# Patient Record
Sex: FEMALE | Race: WHITE | NOT HISPANIC OR LATINO | Employment: UNEMPLOYED | URBAN - METROPOLITAN AREA
[De-identification: names, ages, dates, MRNs, and addresses within clinical notes are randomized per-mention and may not be internally consistent; named-entity substitution may affect disease eponyms.]

---

## 2019-04-10 ENCOUNTER — OFFICE VISIT (OUTPATIENT)
Dept: OBSTETRICS AND GYNECOLOGY | Facility: CLINIC | Age: 34
End: 2019-04-10
Payer: MEDICAID

## 2019-04-10 VITALS
DIASTOLIC BLOOD PRESSURE: 101 MMHG | SYSTOLIC BLOOD PRESSURE: 149 MMHG | WEIGHT: 128 LBS | BODY MASS INDEX: 20.57 KG/M2 | HEIGHT: 66 IN | HEART RATE: 75 BPM

## 2019-04-10 DIAGNOSIS — B96.89 BACTERIAL VAGINOSIS: Primary | ICD-10-CM

## 2019-04-10 DIAGNOSIS — N76.0 BACTERIAL VAGINOSIS: Primary | ICD-10-CM

## 2019-04-10 PROCEDURE — 99212 OFFICE O/P EST SF 10 MIN: CPT | Mod: S$GLB,,, | Performed by: OBSTETRICS & GYNECOLOGY

## 2019-04-10 PROCEDURE — 99212 PR OFFICE/OUTPT VISIT, EST, LEVL II, 10-19 MIN: ICD-10-PCS | Mod: S$GLB,,, | Performed by: OBSTETRICS & GYNECOLOGY

## 2019-04-10 RX ORDER — PROPRANOLOL HYDROCHLORIDE 60 MG/1
CAPSULE, EXTENDED RELEASE ORAL
COMMUNITY
End: 2022-10-18

## 2019-04-10 NOTE — PROGRESS NOTES
Subjective:       Patient ID: Stephy Spicer is a 33 y.o. female.    Chief Complaint: Vaginal Discharge    Patient is known to me with a history of recurrent bacterial vaginosis.  It has recurred now stasis seems to be worse as she gets older.  She has tried metronidazole with good results and recurs.  She denies douching.  Slide this time shows multiple clue cells or Trichomonas in the pH is basic    Review of Systems    Objective:      Physical Exam   Genitourinary: Vagina normal.   Genitourinary Comments: Patient is a pretty copious discharge with a mild musty odor pH is definitely basic       Assessment:     recurring bacterial vaginosis      Plan:       *  plan is metronidazole full 500 mg b.i.d. for 6 days the in Metrogel vaginal every 4 days and with intercourse

## 2019-05-09 ENCOUNTER — OFFICE VISIT (OUTPATIENT)
Dept: OBSTETRICS AND GYNECOLOGY | Facility: CLINIC | Age: 34
End: 2019-05-09
Payer: MEDICAID

## 2019-05-09 VITALS
WEIGHT: 133.38 LBS | HEART RATE: 70 BPM | DIASTOLIC BLOOD PRESSURE: 98 MMHG | SYSTOLIC BLOOD PRESSURE: 132 MMHG | BODY MASS INDEX: 21.53 KG/M2

## 2019-05-09 DIAGNOSIS — R10.32 LEFT LOWER QUADRANT PAIN: Primary | ICD-10-CM

## 2019-05-09 PROCEDURE — 99213 OFFICE O/P EST LOW 20 MIN: CPT | Mod: S$GLB,,, | Performed by: OBSTETRICS & GYNECOLOGY

## 2019-05-09 PROCEDURE — 99213 PR OFFICE/OUTPT VISIT, EST, LEVL III, 20-29 MIN: ICD-10-PCS | Mod: S$GLB,,, | Performed by: OBSTETRICS & GYNECOLOGY

## 2019-05-09 NOTE — PROGRESS NOTES
Subjective:       Patient ID: Stephy Spicer is a 33 y.o. female.    Chief Complaint: No chief complaint on file.    Patient is here for complaining of acute pain.  The pain started in her left lower quadrant around to her back about 2:00 a.m. she is scribe the pain is acute onset severe him sharp and stabbing.  She has had a hysterectomy right S&O    Review of Systems   Constitutional: Negative for activity change, appetite change, chills, fever and unexpected weight change.   HENT: Negative for congestion, dental problem, facial swelling, hearing loss and mouth sores.    Respiratory: Negative for apnea, chest tightness, shortness of breath and wheezing.    Cardiovascular: Negative for chest pain and leg swelling.   Gastrointestinal: Negative for abdominal distention, abdominal pain, anal bleeding, blood in stool, constipation, diarrhea, nausea, rectal pain and vomiting.   Genitourinary: Negative for decreased urine volume, difficulty urinating, dyspareunia, dysuria, enuresis, flank pain, frequency, genital sores, hematuria, menstrual problem, pelvic pain, urgency, vaginal bleeding, vaginal discharge and vaginal pain.   Musculoskeletal: Negative for arthralgias, back pain, joint swelling, myalgias and neck pain.   Skin: Negative for color change, pallor, rash and wound.   Allergic/Immunologic: Negative for immunocompromised state.   Neurological: Negative for dizziness, light-headedness and headaches.   Hematological: Negative for adenopathy. Does not bruise/bleed easily.   Psychiatric/Behavioral: Negative for agitation, behavioral problems, confusion, sleep disturbance and suicidal ideas. The patient is not nervous/anxious and is not hyperactive.        Objective:      Physical Exam   Constitutional: She is oriented to person, place, and time. She appears well-developed and well-nourished.   HENT:   Head: Normocephalic.   Nose: Nose normal.   Eyes: Pupils are equal, round, and reactive to light. Conjunctivae and  EOM are normal.   Neck: Normal range of motion. Neck supple.   Cardiovascular: Normal rate, regular rhythm, normal heart sounds and intact distal pulses.   Pulmonary/Chest: Effort normal and breath sounds normal.   Abdominal: Soft. Bowel sounds are normal. There is tenderness. There is guarding.   Genitourinary: Vagina normal and uterus normal.   Musculoskeletal: Normal range of motion.   Neurological: She is alert and oriented to person, place, and time.   Skin: Skin is warm and dry.   Psychiatric: She has a normal mood and affect. Her behavior is normal. Thought content normal.       Assessment:     abdominal pain acute possible kidney stone  Plan:       Referred to the emergency room so she can get the proper testing

## 2019-05-14 ENCOUNTER — OFFICE VISIT (OUTPATIENT)
Dept: OBSTETRICS AND GYNECOLOGY | Facility: CLINIC | Age: 34
End: 2019-05-14
Payer: MEDICAID

## 2019-05-14 VITALS
HEART RATE: 77 BPM | BODY MASS INDEX: 21.31 KG/M2 | WEIGHT: 132 LBS | DIASTOLIC BLOOD PRESSURE: 114 MMHG | SYSTOLIC BLOOD PRESSURE: 162 MMHG

## 2019-05-14 DIAGNOSIS — R10.2 PELVIC PAIN: Primary | ICD-10-CM

## 2019-05-14 PROCEDURE — 99213 PR OFFICE/OUTPT VISIT, EST, LEVL III, 20-29 MIN: ICD-10-PCS | Mod: S$GLB,,, | Performed by: OBSTETRICS & GYNECOLOGY

## 2019-05-14 PROCEDURE — 99213 OFFICE O/P EST LOW 20 MIN: CPT | Mod: S$GLB,,, | Performed by: OBSTETRICS & GYNECOLOGY

## 2019-05-14 NOTE — PROGRESS NOTES
Patient returns today still with left lower quadrant cane pain she went to the emergency room CT scan showed possible ruptured left ovarian cyst she is feeling better but still has a lot of pain and has chronic pain and left lower quadrant she had a previous right S&O hysterectomy she was told before the surgeon's possibly an ovarian cancer however was not advised to remove the other ovary pelvic examination is normal tenderness in the left lower quadrant patient desires removal the left ovary she id will schedule a laparoscopic left S&O

## 2019-06-04 ENCOUNTER — OFFICE VISIT (OUTPATIENT)
Dept: OBSTETRICS AND GYNECOLOGY | Facility: CLINIC | Age: 34
End: 2019-06-04
Payer: MEDICAID

## 2019-06-04 VITALS
BODY MASS INDEX: 20.6 KG/M2 | SYSTOLIC BLOOD PRESSURE: 130 MMHG | HEART RATE: 66 BPM | WEIGHT: 128.19 LBS | HEIGHT: 66 IN | DIASTOLIC BLOOD PRESSURE: 88 MMHG

## 2019-06-04 DIAGNOSIS — R10.2 PELVIC PAIN: Primary | ICD-10-CM

## 2019-06-04 PROCEDURE — 99213 PR OFFICE/OUTPT VISIT, EST, LEVL III, 20-29 MIN: ICD-10-PCS | Mod: S$GLB,,, | Performed by: OBSTETRICS & GYNECOLOGY

## 2019-06-04 PROCEDURE — 99213 OFFICE O/P EST LOW 20 MIN: CPT | Mod: S$GLB,,, | Performed by: OBSTETRICS & GYNECOLOGY

## 2019-06-04 RX ORDER — TRAMADOL HYDROCHLORIDE 50 MG/1
50 TABLET ORAL
COMMUNITY
Start: 2019-05-09 | End: 2022-10-18

## 2019-06-04 NOTE — PATIENT INSTRUCTIONS
Discussed surgery left S&O and also depending what we find on the left side will depend on the direction of surgery

## 2019-06-04 NOTE — PROGRESS NOTES
Patient is here for preop she has chronic pain in left lower quadrant with try her on birth control pills hormones incised in a which is help she is currently on some tramadol for the pain states she wants to with no removed understands she will be on hormones afterwards she was recently started on blood pressure pills Inderal 60 mg once a day history and physical dictated consents were explained written signed and witnessed and surgery scheduled for tomorrow

## 2019-06-05 ENCOUNTER — OUTSIDE PLACE OF SERVICE (OUTPATIENT)
Dept: OBSTETRICS AND GYNECOLOGY | Facility: CLINIC | Age: 34
End: 2019-06-05
Payer: MEDICAID

## 2019-06-05 PROCEDURE — 58661 PR LAP,RMV  ADNEXAL STRUCTURE: ICD-10-PCS | Mod: LT,,, | Performed by: OBSTETRICS & GYNECOLOGY

## 2019-06-05 PROCEDURE — 58661 LAPAROSCOPY REMOVE ADNEXA: CPT | Mod: LT,,, | Performed by: OBSTETRICS & GYNECOLOGY

## 2019-06-13 ENCOUNTER — OFFICE VISIT (OUTPATIENT)
Dept: OBSTETRICS AND GYNECOLOGY | Facility: CLINIC | Age: 34
End: 2019-06-13
Payer: MEDICAID

## 2019-06-13 VITALS
HEART RATE: 77 BPM | DIASTOLIC BLOOD PRESSURE: 96 MMHG | SYSTOLIC BLOOD PRESSURE: 141 MMHG | WEIGHT: 128.38 LBS | BODY MASS INDEX: 20.72 KG/M2

## 2019-06-13 DIAGNOSIS — G89.18 POST-OPERATIVE PAIN: Primary | ICD-10-CM

## 2019-06-13 PROCEDURE — 99024 PR POST-OP FOLLOW-UP VISIT: ICD-10-PCS | Mod: S$GLB,,, | Performed by: OBSTETRICS & GYNECOLOGY

## 2019-06-13 PROCEDURE — 99024 POSTOP FOLLOW-UP VISIT: CPT | Mod: S$GLB,,, | Performed by: OBSTETRICS & GYNECOLOGY

## 2019-06-13 NOTE — PROGRESS NOTES
Patient is is here status post laparoscopic left S&O for hemorrhagic draining ovarian cyst she is doing well except for significant constipation or put her on Linzess her abdomen is soft sutures removed Ob put her on Linzess 145 and keep from digoxin 0.1 cm she does

## 2019-09-05 ENCOUNTER — OFFICE VISIT (OUTPATIENT)
Dept: OBSTETRICS AND GYNECOLOGY | Facility: CLINIC | Age: 34
End: 2019-09-05
Payer: MEDICAID

## 2019-09-05 VITALS
BODY MASS INDEX: 20.15 KG/M2 | SYSTOLIC BLOOD PRESSURE: 145 MMHG | WEIGHT: 125.38 LBS | DIASTOLIC BLOOD PRESSURE: 95 MMHG | HEART RATE: 68 BPM | HEIGHT: 66 IN

## 2019-09-05 DIAGNOSIS — R13.19 ESOPHAGEAL DYSPHAGIA: Primary | ICD-10-CM

## 2019-09-05 PROCEDURE — 99213 PR OFFICE/OUTPT VISIT, EST, LEVL III, 20-29 MIN: ICD-10-PCS | Mod: S$GLB,,, | Performed by: OBSTETRICS & GYNECOLOGY

## 2019-09-05 PROCEDURE — 99213 OFFICE O/P EST LOW 20 MIN: CPT | Mod: S$GLB,,, | Performed by: OBSTETRICS & GYNECOLOGY

## 2019-09-05 NOTE — PROGRESS NOTES
Subjective:       Patient ID: Stephy Spicer is a 33 y.o. female.    Chief Complaint: Abdominal Pain (pt wants to be referred for stomach pain)    A 33-year-old female complaining of a lot of pain in her epigastric area immediately after she eats (see the epigastric area) his much better taking the Linzess with chronic constipation she had a history of endometriosis and has had a hysterectomy and bilateral oophorectomy    Review of Systems   Constitutional: Negative for activity change, appetite change, chills, fever and unexpected weight change.   HENT: Negative for congestion, dental problem, facial swelling, hearing loss and mouth sores.    Respiratory: Negative for apnea, chest tightness, shortness of breath and wheezing.    Cardiovascular: Negative for chest pain and leg swelling.   Gastrointestinal: Negative for abdominal distention, abdominal pain, anal bleeding, blood in stool, constipation, diarrhea, nausea, rectal pain and vomiting.   Genitourinary: Negative for decreased urine volume, difficulty urinating, dyspareunia, dysuria, enuresis, flank pain, frequency, genital sores, hematuria, menstrual problem, pelvic pain, urgency, vaginal bleeding, vaginal discharge and vaginal pain.   Musculoskeletal: Negative for arthralgias, back pain, joint swelling, myalgias and neck pain.   Skin: Negative for color change, pallor, rash and wound.   Allergic/Immunologic: Negative for immunocompromised state.   Neurological: Negative for dizziness, light-headedness and headaches.   Hematological: Negative for adenopathy. Does not bruise/bleed easily.   Psychiatric/Behavioral: Negative for agitation, behavioral problems, confusion, sleep disturbance and suicidal ideas. The patient is not nervous/anxious and is not hyperactive.        Objective:      Physical Exam   Abdominal:   Minimally tender epigastric area   Genitourinary: Vagina normal.   Genitourinary Comments: Bimanual is normal no tenderness or masses        Assessment:     dysphagia    Plan:     barium swallow

## 2019-12-05 DIAGNOSIS — K58.9 IRRITABLE BOWEL SYNDROME, UNSPECIFIED TYPE: Primary | ICD-10-CM

## 2019-12-05 DIAGNOSIS — Z79.890 HORMONE REPLACEMENT THERAPY (HRT): ICD-10-CM

## 2019-12-05 RX ORDER — ESTRADIOL 1 MG/G
1 GEL TOPICAL DAILY
Qty: 30 G | Refills: 3 | Status: SHIPPED | OUTPATIENT
Start: 2019-12-05 | End: 2020-03-02

## 2020-03-02 ENCOUNTER — OFFICE VISIT (OUTPATIENT)
Dept: OBSTETRICS AND GYNECOLOGY | Facility: CLINIC | Age: 35
End: 2020-03-02
Payer: MEDICAID

## 2020-03-02 VITALS
DIASTOLIC BLOOD PRESSURE: 126 MMHG | BODY MASS INDEX: 21.11 KG/M2 | WEIGHT: 130.81 LBS | SYSTOLIC BLOOD PRESSURE: 167 MMHG | HEART RATE: 80 BPM

## 2020-03-02 DIAGNOSIS — N95.2 ATROPHIC VAGINITIS: Primary | ICD-10-CM

## 2020-03-02 PROCEDURE — 99213 OFFICE O/P EST LOW 20 MIN: CPT | Mod: S$GLB,,, | Performed by: OBSTETRICS & GYNECOLOGY

## 2020-03-02 PROCEDURE — 99213 PR OFFICE/OUTPT VISIT, EST, LEVL III, 20-29 MIN: ICD-10-PCS | Mod: S$GLB,,, | Performed by: OBSTETRICS & GYNECOLOGY

## 2020-03-02 RX ORDER — ESTERIFIED ESTROGEN AND METHYLTESTOSTERONE .625; 1.25 MG/1; MG/1
1 TABLET ORAL DAILY
Qty: 30 TABLET | Refills: 5 | Status: SHIPPED | OUTPATIENT
Start: 2020-03-02 | End: 2022-10-18

## 2020-03-02 NOTE — PROGRESS NOTES
Patient is here complaining of stinging her vagina dry she has tried different over-the-counter medicines the just seemed to burn the vagina the has stopped taking her DVD gel she has had both ovaries removed and no supplemental hormone does hurt right a bike in the suprapubic area on examination the bladder is nontender pelvic is moves very well no evidence of any scar tissue vagina is very dry and atrophic

## 2020-08-19 ENCOUNTER — TELEPHONE (OUTPATIENT)
Dept: OBSTETRICS AND GYNECOLOGY | Facility: CLINIC | Age: 35
End: 2020-08-19

## 2020-08-19 NOTE — TELEPHONE ENCOUNTER
----- Message from Princess Egan sent at 8/19/2020  2:23 PM CDT -----  Type:  Needs Medical Advice    Who Called: pt   Symptoms (please be specific): burning,discharge    How long has patient had these symptoms:  1week   Pharmacy name and phone #:    Would the patient rather a call back or a response via [facility] [facility]? Callback   Best Call Back Number: [phone]   Additional Information:

## 2021-06-23 ENCOUNTER — OFFICE VISIT (OUTPATIENT)
Dept: OBGYN | Age: 36
End: 2021-06-23
Payer: MEDICARE

## 2021-06-23 ENCOUNTER — HOSPITAL ENCOUNTER (OUTPATIENT)
Age: 36
Setting detail: SPECIMEN
Discharge: HOME | End: 2021-06-23
Payer: MEDICARE

## 2021-06-23 ENCOUNTER — HOSPITAL ENCOUNTER (OUTPATIENT)
Dept: ULTRASOUND IMAGING | Age: 36
Discharge: HOME | End: 2021-06-25
Payer: MEDICARE

## 2021-06-23 VITALS
HEART RATE: 70 BPM | WEIGHT: 139.8 LBS | DIASTOLIC BLOOD PRESSURE: 70 MMHG | OXYGEN SATURATION: 98 % | SYSTOLIC BLOOD PRESSURE: 120 MMHG | HEIGHT: 66 IN | BODY MASS INDEX: 22.47 KG/M2

## 2021-06-23 DIAGNOSIS — N94.10 PAIN IN FEMALE GENITALIA ON INTERCOURSE: ICD-10-CM

## 2021-06-23 DIAGNOSIS — N76.0 ACUTE VAGINITIS: ICD-10-CM

## 2021-06-23 DIAGNOSIS — N94.10 PAIN IN FEMALE GENITALIA ON INTERCOURSE: Primary | ICD-10-CM

## 2021-06-23 PROBLEM — I10 ESSENTIAL (PRIMARY) HYPERTENSION: Status: ACTIVE | Noted: 2021-05-20

## 2021-06-23 PROBLEM — G43.909 MIGRAINE, UNSPECIFIED, NOT INTRACTABLE, WITHOUT STATUS MIGRAINOSUS: Status: ACTIVE | Noted: 2021-05-20

## 2021-06-23 LAB
DIRECT EXAM: ABNORMAL
Lab: ABNORMAL
SPECIMEN DESCRIPTION: ABNORMAL

## 2021-06-23 PROCEDURE — 1036F TOBACCO NON-USER: CPT | Performed by: OBSTETRICS & GYNECOLOGY

## 2021-06-23 PROCEDURE — 87591 N.GONORRHOEAE DNA AMP PROB: CPT

## 2021-06-23 PROCEDURE — G8420 CALC BMI NORM PARAMETERS: HCPCS | Performed by: OBSTETRICS & GYNECOLOGY

## 2021-06-23 PROCEDURE — 87480 CANDIDA DNA DIR PROBE: CPT

## 2021-06-23 PROCEDURE — 87510 GARDNER VAG DNA DIR PROBE: CPT

## 2021-06-23 PROCEDURE — 99214 OFFICE O/P EST MOD 30 MIN: CPT

## 2021-06-23 PROCEDURE — 99202 OFFICE O/P NEW SF 15 MIN: CPT | Performed by: OBSTETRICS & GYNECOLOGY

## 2021-06-23 PROCEDURE — 87070 CULTURE OTHR SPECIMN AEROBIC: CPT

## 2021-06-23 PROCEDURE — 87491 CHLMYD TRACH DNA AMP PROBE: CPT

## 2021-06-23 PROCEDURE — 87660 TRICHOMONAS VAGIN DIR PROBE: CPT

## 2021-06-23 PROCEDURE — G8427 DOCREV CUR MEDS BY ELIG CLIN: HCPCS | Performed by: OBSTETRICS & GYNECOLOGY

## 2021-06-23 PROCEDURE — 76856 US EXAM PELVIC COMPLETE: CPT

## 2021-06-23 RX ORDER — CLOTRIMAZOLE AND BETAMETHASONE DIPROPIONATE 10; .64 MG/G; MG/G
CREAM TOPICAL
Qty: 1 TUBE | Refills: 1 | Status: SHIPPED | OUTPATIENT
Start: 2021-06-23

## 2021-06-23 NOTE — PROGRESS NOTES
 Frequency of Social Gatherings with Friends and Family:     Attends Baptism Services:     Active Member of Clubs or Organizations:     Attends Club or Organization Meetings:     Marital Status:    Intimate Partner Violence:     Fear of Current or Ex-Partner:     Emotionally Abused:     Physically Abused:     Sexually Abused:          MEDICATIONS:  No current outpatient medications on file. No current facility-administered medications for this visit. ALLERGIES:  Allergies as of 06/23/2021 - Fully Reviewed 06/23/2021   Allergen Reaction Noted    Topiramate Other (See Comments) 06/23/2021         REVIEW OF SYSTEMS:       A minimum of an eleven point review of systems was completed. Review Of Systems (11 point):  Constitutional: No fever, chills or malaise; No weight change or fatigue  Head and Eyes: No vision, Headache, Dizziness or trauma in last 12 months  ENT ROS: No hearing, Tinnitis, sinus or taste problems  Hematological and Lymphatic ROS:No Lymphoma, Von Willebrand's, Hemophillia or Bleeding History  Psych ROS: No Depression, Homicidal thoughts,suicidal thoughts, or anxiety  Breast ROS: No prior breast abnormalities or lumps  Respiratory ROS: No SOB, Pneumoniae,Cough, or Pulmonary Embolism History  Cardiovascular ROS: No Chest Pain with Exertion, Palpitations, Syncope, Edema, Arrhythmia  Gastrointestinal ROS: No Indigestion, Heartburn, Nausea, vomiting, Diarrhea, Constipation,or Bowel Changes; No Bloody Stools or melena  Genito-Urinary ROS: No Dysuria, Hematuria or Nocturia. No Urinary Incontinence or Vaginal Discharge  Musculoskeletal ROS: No Arthralgia, Arthritis,Gout,Osteoporosis or Rheumatism  Neurological ROS: No CVA, Migraines, Epilepsy, Seizure Hx, or Limb Weakness  Dermatological ROS: No Rash, Itching, Hives, Mole Changes or Cancer          Blood pressure 120/70, pulse 70, height 5' 6\" (1.676 m), weight 139 lb 12.8 oz (63.4 kg), SpO2 98 %.     Chaperone for Intimate Exam   Chaperone was offered and accepted as part of the rooming process.  Chaperone: Karen         Abdomen: Soft non-tender; good bowel sounds. No guarding, rebound or rigidity. No CVA tenderness bilaterally. Extremities: No calf tenderness, DTR 2/4, and No edema bilaterally    Pelvic: Vulva and vagina appear normal. Bimanual exam reveals normal uterus and adnexa. Diagnostics:  No results found. Lab Results:  No results found for this or any previous visit. Assessment:   Diagnosis Orders   1. Pain in female genitalia on intercourse  US PELVIS COMPLETE NON-OB TRANSABDOMINAL AND TRANSVAGINAL   2. Acute vaginitis       Patient Active Problem List    Diagnosis Date Noted    Essential (primary) hypertension 05/20/2021    Migraine, unspecified, not intractable, without status migrainosus 05/20/2021         PLAN:  Return in about 4 weeks (around 7/21/2021) for Follow up current problem. Pelvic sono/ cultures/ U/A C&S  Repeat Annual every 1 year  Cervical Cytology Evaluation begins at 24years old. If Negative Cytology, Follow-up screening per current guidelines. Return to the office in 3-4 weeks. Counseled on preventative health maintenance follow-up. No orders of the defined types were placed in this encounter. The patient, Chucky Fox is a 28 y.o. female, was seen with a total time spent of 20 minutes for the visit on this date of service by the E/M provider. The time component had both face to face and non face to face time spent in determining the total time component. Counseling and education regarding her diagnosis listed below and her options regarding those diagnoses were also included in determining her time component. Diagnosis Orders   1. Pain in female genitalia on intercourse  US PELVIS COMPLETE NON-OB TRANSABDOMINAL AND TRANSVAGINAL   2.  Acute vaginitis          The patient had her preventative health maintenance recommendations and follow-up reviewed with her at the completion of her visit.

## 2021-06-24 LAB
C TRACH DNA GENITAL QL NAA+PROBE: NEGATIVE
N. GONORRHOEAE DNA: NEGATIVE
SPECIMEN DESCRIPTION: NORMAL

## 2021-06-25 ENCOUNTER — PATIENT MESSAGE (OUTPATIENT)
Dept: OBGYN | Age: 36
End: 2021-06-25

## 2021-06-26 LAB
CULTURE: NORMAL
Lab: NORMAL
SPECIMEN DESCRIPTION: NORMAL

## 2021-07-02 ENCOUNTER — TELEPHONE (OUTPATIENT)
Dept: OBGYN | Age: 36
End: 2021-07-02

## 2021-07-02 NOTE — TELEPHONE ENCOUNTER
Attempted to reach by phone, not a working number, will extend x 1 month.  Reminder call for lab work

## 2021-08-23 ENCOUNTER — TELEPHONE (OUTPATIENT)
Dept: OBGYN | Age: 36
End: 2021-08-23

## 2021-08-23 NOTE — LETTER
[address] OB G[facility] A department of [place] [address]  Phone: [phone]  Fax: MICHELLE Parada CNM        August 23, 2021     Ethan [id number]      Dear Tyler Garcia: This letter is a reminder that your Urinalysis test is due. Please call our office to schedule an appointment. If you've already underwent or scheduled this procedure, please disregard this notice.     Sincerely,        MICHELLE Bernstein CNM

## 2021-09-15 ENCOUNTER — OFFICE VISIT (OUTPATIENT)
Dept: OBSTETRICS AND GYNECOLOGY | Facility: CLINIC | Age: 36
End: 2021-09-15
Payer: MEDICAID

## 2021-09-15 VITALS
HEART RATE: 90 BPM | SYSTOLIC BLOOD PRESSURE: 126 MMHG | DIASTOLIC BLOOD PRESSURE: 82 MMHG | WEIGHT: 143 LBS | BODY MASS INDEX: 23.08 KG/M2

## 2021-09-15 DIAGNOSIS — N76.1 CHRONIC VAGINITIS: Primary | ICD-10-CM

## 2021-09-15 PROCEDURE — 99213 OFFICE O/P EST LOW 20 MIN: CPT | Mod: S$GLB,,, | Performed by: OBSTETRICS & GYNECOLOGY

## 2021-09-15 PROCEDURE — 99213 PR OFFICE/OUTPT VISIT, EST, LEVL III, 20-29 MIN: ICD-10-PCS | Mod: S$GLB,,, | Performed by: OBSTETRICS & GYNECOLOGY

## 2021-09-17 LAB
CANDIDA GLABRATA DNA: NOT DETECTED
CANDIDA GROUP DNA: NOT DETECTED
CANDIDA KRUSEI DNA: NOT DETECTED
TRICHOMONAS DNA: NOT DETECTED
VAGINOSIS PANEL DNA: DETECTED

## 2021-10-04 ENCOUNTER — TELEPHONE (OUTPATIENT)
Dept: OBGYN | Age: 36
End: 2021-10-04

## 2021-10-04 NOTE — LETTER
[address] OB [facility] A department of [place] [address]  Phone: [phone]  Fax: Enoch [phone], QI        October 4, 2021     [place] [id number]      Dear Phil Cotton: This letter is a reminder that your Urinalysis test is due. Please call our office to schedule an appointment. If you've already underwent or scheduled this procedure, please disregard this notice.     Sincerely,        Stefanie Bolanos DO

## 2021-10-06 ENCOUNTER — TELEPHONE (OUTPATIENT)
Dept: OBSTETRICS AND GYNECOLOGY | Facility: CLINIC | Age: 36
End: 2021-10-06

## 2021-10-06 ENCOUNTER — OFFICE VISIT (OUTPATIENT)
Dept: OBSTETRICS AND GYNECOLOGY | Facility: CLINIC | Age: 36
End: 2021-10-06
Payer: MEDICAID

## 2021-10-06 VITALS
BODY MASS INDEX: 23.08 KG/M2 | HEART RATE: 71 BPM | SYSTOLIC BLOOD PRESSURE: 142 MMHG | DIASTOLIC BLOOD PRESSURE: 96 MMHG | WEIGHT: 143 LBS

## 2021-10-06 DIAGNOSIS — N76.1 SUBACUTE VAGINITIS: Primary | ICD-10-CM

## 2021-10-06 PROCEDURE — 99212 PR OFFICE/OUTPT VISIT, EST, LEVL II, 10-19 MIN: ICD-10-PCS | Mod: S$GLB,,, | Performed by: OBSTETRICS & GYNECOLOGY

## 2021-10-06 PROCEDURE — 99212 OFFICE O/P EST SF 10 MIN: CPT | Mod: S$GLB,,, | Performed by: OBSTETRICS & GYNECOLOGY

## 2021-11-15 ENCOUNTER — TELEPHONE (OUTPATIENT)
Dept: OBGYN | Age: 36
End: 2021-11-15

## 2022-03-09 DIAGNOSIS — K58.9 IRRITABLE BOWEL SYNDROME, UNSPECIFIED TYPE: ICD-10-CM

## 2022-03-21 ENCOUNTER — TELEPHONE (OUTPATIENT)
Dept: OBSTETRICS AND GYNECOLOGY | Facility: CLINIC | Age: 37
End: 2022-03-21
Payer: MEDICAID

## 2022-03-21 NOTE — TELEPHONE ENCOUNTER
----- Message from Paris Dooley LPN sent at 3/18/2022  4:30 PM CDT -----  Contact: self  I attempted to contact pt but VM not set up.   ----- Message -----  From: Paris Tran  Sent: 3/18/2022  11:53 AM CDT  To: [facility] Staff    Pt calling about a compound medication for estralidotlinda hydrocort.  Pt can be reached at [phone].  Pt stated pharmacy faxed script and pt is out of script and needs asap.        [facility]  [place], [place]  [phone]

## 2022-03-21 NOTE — TELEPHONE ENCOUNTER
Spoke with  pharmacist, he states pt has a refill on noted compound rx. Called pt, no answer. LVM to call  Pharmacy to refill medication.

## 2022-04-20 ENCOUNTER — TELEPHONE (OUTPATIENT)
Dept: OBGYN | Age: 37
End: 2022-04-20

## 2022-09-08 ENCOUNTER — TELEPHONE (OUTPATIENT)
Dept: PRIMARY CARE CLINIC | Facility: CLINIC | Age: 37
End: 2022-09-08
Payer: MEDICAID

## 2022-09-08 NOTE — TELEPHONE ENCOUNTER
----- Message from Viola Nur sent at 9/8/2022 11:13 AM CDT -----  Contact: pt  .Type:  Patient Returning Call    Who Called:brent   Who Left Message for Patient:  Does the patient know what this is regarding?:esb care   Would the patient rather a call back or a response via [facility]?   Best Call Back Number:.[phone]    Additional Information:

## 2022-09-21 ENCOUNTER — TELEPHONE (OUTPATIENT)
Dept: OBSTETRICS AND GYNECOLOGY | Facility: CLINIC | Age: 37
End: 2022-09-21
Payer: MEDICAID

## 2022-09-21 NOTE — TELEPHONE ENCOUNTER
----- Message from Kendra Caraballo sent at 9/21/2022  9:46 AM CDT -----  Contact: self  Type:  RX Refill Request    Who Called:  Stephy Spicer   Refill or New Rx: REFILL  RX Name and Strength: estradiol cream    Preferred Pharmacy with phone number:   [facility] - [place], [place] - [facility] [address] [address]  Phone: [phone] Fax: [phone]     Local or Mail Order: LOCAL  Ordering Provider: MICHAEL  Would the patient rather a call back or a response via [facility]?  CALL BACK   Best Call Back Number:[phone]   Additional Information: N/A

## 2022-09-21 NOTE — TELEPHONE ENCOUNTER
Called pt, notified 1 refill on hormone cream called into [facility]. Appt made for annual, enc'd pt to request more refills from Dr. Bautista if needed at that time.

## 2022-10-18 ENCOUNTER — OFFICE VISIT (OUTPATIENT)
Dept: OBSTETRICS AND GYNECOLOGY | Facility: CLINIC | Age: 37
End: 2022-10-18
Payer: MEDICAID

## 2022-10-18 VITALS
SYSTOLIC BLOOD PRESSURE: 150 MMHG | WEIGHT: 132 LBS | DIASTOLIC BLOOD PRESSURE: 82 MMHG | HEART RATE: 80 BPM | BODY MASS INDEX: 21.31 KG/M2

## 2022-10-18 DIAGNOSIS — Z12.4 SCREENING FOR MALIGNANT NEOPLASM OF THE CERVIX: ICD-10-CM

## 2022-10-18 DIAGNOSIS — Z12.31 SCREENING MAMMOGRAM FOR BREAST CANCER: ICD-10-CM

## 2022-10-18 DIAGNOSIS — Z01.419 WELL WOMAN EXAM WITH ROUTINE GYNECOLOGICAL EXAM: Primary | ICD-10-CM

## 2022-10-18 PROCEDURE — 1159F MED LIST DOCD IN RCRD: CPT | Mod: CPTII,S$GLB,, | Performed by: OBSTETRICS & GYNECOLOGY

## 2022-10-18 PROCEDURE — 99395 PR PREVENTIVE VISIT,EST,18-39: ICD-10-PCS | Mod: S$GLB,,, | Performed by: OBSTETRICS & GYNECOLOGY

## 2022-10-18 PROCEDURE — 1159F PR MEDICATION LIST DOCUMENTED IN MEDICAL RECORD: ICD-10-PCS | Mod: CPTII,S$GLB,, | Performed by: OBSTETRICS & GYNECOLOGY

## 2022-10-18 PROCEDURE — 3079F DIAST BP 80-89 MM HG: CPT | Mod: CPTII,S$GLB,, | Performed by: OBSTETRICS & GYNECOLOGY

## 2022-10-18 PROCEDURE — 99395 PREV VISIT EST AGE 18-39: CPT | Mod: S$GLB,,, | Performed by: OBSTETRICS & GYNECOLOGY

## 2022-10-18 PROCEDURE — 3077F PR MOST RECENT SYSTOLIC BLOOD PRESSURE >= 140 MM HG: ICD-10-PCS | Mod: CPTII,S$GLB,, | Performed by: OBSTETRICS & GYNECOLOGY

## 2022-10-18 PROCEDURE — 3079F PR MOST RECENT DIASTOLIC BLOOD PRESSURE 80-89 MM HG: ICD-10-PCS | Mod: CPTII,S$GLB,, | Performed by: OBSTETRICS & GYNECOLOGY

## 2022-10-18 PROCEDURE — 3077F SYST BP >= 140 MM HG: CPT | Mod: CPTII,S$GLB,, | Performed by: OBSTETRICS & GYNECOLOGY

## 2022-10-18 RX ORDER — ESTRADIOL 1 MG/1
1 TABLET ORAL DAILY
Qty: 90 TABLET | Refills: 3 | Status: SHIPPED | OUTPATIENT
Start: 2022-10-18 | End: 2023-10-18

## 2022-10-18 RX ORDER — METRONIDAZOLE 7.5 MG/G
1 GEL VAGINAL NIGHTLY
Qty: 1 APPLICATOR | Refills: 5 | Status: SHIPPED | OUTPATIENT
Start: 2022-10-18 | End: 2023-12-19 | Stop reason: SDUPTHER

## 2022-10-18 NOTE — PROGRESS NOTES
@Subjective:       Patient ID: Stephy Spicer is a 37 y.o. female.    Chief Complaint: Well Woman    This is a 37-year-old female who had a couple episodes of sharp lower abdominal pain she had a previous the BSO hysterectomy for endometriosis severe pelvic adhesions she is on local estrogen only has a history of recurrent BV she is not on any hormones in with BSO early I think she would benefit from estrogen both vaginally and bone loss prevention    Review of Systems   Constitutional:  Negative for activity change, appetite change, chills, fever and unexpected weight change.   HENT:  Negative for nasal congestion, dental problem, facial swelling, hearing loss and mouth sores.    Respiratory:  Negative for apnea, chest tightness, shortness of breath and wheezing.    Cardiovascular:  Negative for chest pain and leg swelling.   Gastrointestinal:  Negative for abdominal distention, abdominal pain, anal bleeding, blood in stool, constipation, diarrhea, nausea, rectal pain and vomiting.   Genitourinary:  Positive for vaginal discharge. Negative for decreased urine volume, difficulty urinating, dyspareunia, dysuria, enuresis, flank pain, frequency, genital sores, hematuria, menstrual problem, pelvic pain, urgency, vaginal bleeding and vaginal pain.   Musculoskeletal:  Negative for arthralgias, back pain, joint swelling, myalgias and neck pain.   Integumentary:  Negative for color change, pallor, rash and wound.   Allergic/Immunologic: Negative for immunocompromised state.   Neurological:  Negative for dizziness, light-headedness and headaches.   Hematological:  Negative for adenopathy. Does not bruise/bleed easily.   Psychiatric/Behavioral:  Negative for agitation, behavioral problems, confusion, sleep disturbance and suicidal ideas. The patient is not nervous/anxious and is not hyperactive.        Objective:      Physical Exam  Constitutional:       Appearance: She is well-developed.   HENT:      Head: Normocephalic.       Nose: Nose normal.   Eyes:      Conjunctiva/sclera: Conjunctivae normal.      Pupils: Pupils are equal, round, and reactive to light.   Cardiovascular:      Rate and Rhythm: Normal rate and regular rhythm.      Heart sounds: Normal heart sounds.   Pulmonary:      Effort: Pulmonary effort is normal.      Breath sounds: Normal breath sounds.   Abdominal:      General: Bowel sounds are normal.      Palpations: Abdomen is soft.   Genitourinary:     Vagina: Normal.      Comments: Vulva vagina bimanual normal slight increased pH of the vagina no pelvic masses good mobility to the lower pelvis  Musculoskeletal:         General: Normal range of motion.      Cervical back: Normal range of motion and neck supple.   Skin:     General: Skin is warm and dry.   Neurological:      Mental Status: She is alert and oriented to person, place, and time.   Psychiatric:         Behavior: Behavior normal.         Thought Content: Thought content normal.   Breast exam normal    Assessment:       Lower abdominal pain bacterial vaginosis        Plan:     Metrogel nightly for 5 nights and then a little bit vaginally once a week for several months, Estrace 1 mg HS

## 2022-10-20 LAB — Lab: NORMAL

## 2022-11-15 ENCOUNTER — TELEPHONE (OUTPATIENT)
Dept: OBSTETRICS AND GYNECOLOGY | Facility: CLINIC | Age: 37
End: 2022-11-15
Payer: MEDICAID

## 2022-11-15 NOTE — TELEPHONE ENCOUNTER
----- Message from Elvia Lynn sent at 11/15/2022 10:43 AM CST -----  Contact: Patient  Patient states [facility] faxed over her script it requires prior auth      Please reach out to Pharmacy so they can refill her Rx    Patient states it's 3 creams that she uses.      Call back # for patient  [phone]          [facility] - [place], [place] - [address] [place] [address]  Phone: [phone] Fax: [phone]

## 2023-03-07 DIAGNOSIS — N32.89 BLADDER SPASMS: Primary | ICD-10-CM

## 2023-04-25 ENCOUNTER — TELEPHONE (OUTPATIENT)
Dept: OBGYN | Age: 38
End: 2023-04-25

## 2023-04-25 NOTE — TELEPHONE ENCOUNTER
Attempted to reach patient regarding her annual exam.  Patient's phone is not working, no emergency contact listed and no mychart set up. No history on patient's chart.

## 2023-12-19 ENCOUNTER — OFFICE VISIT (OUTPATIENT)
Dept: OBSTETRICS AND GYNECOLOGY | Facility: CLINIC | Age: 38
End: 2023-12-19
Payer: MEDICAID

## 2023-12-19 VITALS
BODY MASS INDEX: 23.08 KG/M2 | SYSTOLIC BLOOD PRESSURE: 138 MMHG | HEART RATE: 81 BPM | WEIGHT: 143 LBS | DIASTOLIC BLOOD PRESSURE: 102 MMHG

## 2023-12-19 DIAGNOSIS — N76.1 SUBACUTE VAGINITIS: Primary | ICD-10-CM

## 2023-12-19 PROCEDURE — 3075F PR MOST RECENT SYSTOLIC BLOOD PRESS GE 130-139MM HG: ICD-10-PCS | Mod: CPTII,S$GLB,, | Performed by: OBSTETRICS & GYNECOLOGY

## 2023-12-19 PROCEDURE — 1159F MED LIST DOCD IN RCRD: CPT | Mod: CPTII,S$GLB,, | Performed by: OBSTETRICS & GYNECOLOGY

## 2023-12-19 PROCEDURE — 1159F PR MEDICATION LIST DOCUMENTED IN MEDICAL RECORD: ICD-10-PCS | Mod: CPTII,S$GLB,, | Performed by: OBSTETRICS & GYNECOLOGY

## 2023-12-19 PROCEDURE — 99213 OFFICE O/P EST LOW 20 MIN: CPT | Mod: S$GLB,,, | Performed by: OBSTETRICS & GYNECOLOGY

## 2023-12-19 PROCEDURE — 99213 PR OFFICE/OUTPT VISIT, EST, LEVL III, 20-29 MIN: ICD-10-PCS | Mod: S$GLB,,, | Performed by: OBSTETRICS & GYNECOLOGY

## 2023-12-19 PROCEDURE — 3075F SYST BP GE 130 - 139MM HG: CPT | Mod: CPTII,S$GLB,, | Performed by: OBSTETRICS & GYNECOLOGY

## 2023-12-19 PROCEDURE — 3080F DIAST BP >= 90 MM HG: CPT | Mod: CPTII,S$GLB,, | Performed by: OBSTETRICS & GYNECOLOGY

## 2023-12-19 PROCEDURE — 3080F PR MOST RECENT DIASTOLIC BLOOD PRESSURE >= 90 MM HG: ICD-10-PCS | Mod: CPTII,S$GLB,, | Performed by: OBSTETRICS & GYNECOLOGY

## 2023-12-19 PROCEDURE — 3008F PR BODY MASS INDEX (BMI) DOCUMENTED: ICD-10-PCS | Mod: CPTII,S$GLB,, | Performed by: OBSTETRICS & GYNECOLOGY

## 2023-12-19 PROCEDURE — 3008F BODY MASS INDEX DOCD: CPT | Mod: CPTII,S$GLB,, | Performed by: OBSTETRICS & GYNECOLOGY

## 2023-12-19 RX ORDER — METRONIDAZOLE 7.5 MG/G
1 GEL VAGINAL NIGHTLY
Qty: 1 APPLICATOR | Refills: 5 | Status: SHIPPED | OUTPATIENT
Start: 2023-12-19

## 2023-12-19 NOTE — PROGRESS NOTES
38-year-old female here has a thick white discharge in the past said it seems to be repeating now what she does has some itching on the outside painful intercourse on examination the posterior fourchette she has some red areas that are tender consistent with vestibulitis bimanual reveals a slight watery white type discharge definitely not thick white like yeast bimanual is normal impression is vestibulitis with discharge I did a 1 step culture sent to use and will see what the results show

## 2024-01-09 ENCOUNTER — TELEPHONE (OUTPATIENT)
Dept: OBSTETRICS AND GYNECOLOGY | Facility: CLINIC | Age: 39
End: 2024-01-09
Payer: MEDICAID

## 2024-01-09 NOTE — TELEPHONE ENCOUNTER
----- Message from Kendra Kapoor LPN sent at 1/8/2024  1:30 PM CST -----    ----- Message -----  From: Poonam Mcgrath  Sent: 1/8/2024  11:05 AM CST  To: [facility] Staff    Name of Who is Calling:patient           What is the request in detail:Patient requesting a call back for results.           Can the clinic reply by [facility]:no           What Number to Call Back if not in [facility]: [phone]

## 2024-09-26 ENCOUNTER — OFFICE VISIT (OUTPATIENT)
Dept: OBSTETRICS AND GYNECOLOGY | Facility: CLINIC | Age: 39
End: 2024-09-26

## 2024-09-26 VITALS
HEART RATE: 69 BPM | DIASTOLIC BLOOD PRESSURE: 99 MMHG | WEIGHT: 126 LBS | SYSTOLIC BLOOD PRESSURE: 156 MMHG | BODY MASS INDEX: 20.34 KG/M2

## 2024-09-26 DIAGNOSIS — N93.9 ABNORMAL UTERINE BLEEDING (AUB): Primary | ICD-10-CM

## 2024-09-26 PROCEDURE — 99213 OFFICE O/P EST LOW 20 MIN: CPT | Mod: S$PBB,,, | Performed by: OBSTETRICS & GYNECOLOGY

## 2024-09-26 RX ORDER — ESTRADIOL 0.1 MG/G
1 CREAM VAGINAL DAILY
Qty: 42.5 G | Refills: 5 | Status: SHIPPED | OUTPATIENT
Start: 2024-09-26 | End: 2025-09-26

## 2024-09-26 RX ORDER — CLONIDINE HYDROCHLORIDE 0.1 MG/1
TABLET ORAL
COMMUNITY

## 2024-09-26 NOTE — PROGRESS NOTES
A 39-year-old female complains of some lower abdominal pain she has had a hysterectomy BSO never been on any hormones she does have little discharge also she has no complaints of diarrhea or constipation on examination the vulva has significant red areas around it consistent with atrophic vaginitis the vagina also has significant petechiae and no rugae bimanual is normal my impression is severe atrophic vaginitis also may have some bowel problems with this which will follow up with her family doc or maybe even a kidney stone I will treat her with some Estrace vaginal cream a see how this resolves

## 2024-10-08 ENCOUNTER — TELEPHONE (OUTPATIENT)
Dept: OBSTETRICS AND GYNECOLOGY | Facility: CLINIC | Age: 39
End: 2024-10-08

## 2024-10-08 NOTE — TELEPHONE ENCOUNTER
----- Message from Bibi sent at 10/8/2024  2:18 PM CDT -----  Contact: self  Type:  Patient Returning Call    Who Called:Stephy Spicer  Who Left Message for Patient:unsure  Does the patient know what this is regarding?: fax medical to [facility] [email or web address]  Would the patient rather a call back or a response via [facility]?   Best Call Back Number:[phone]  Additional Information: n/a

## 2025-04-28 ENCOUNTER — TELEPHONE (OUTPATIENT)
Dept: OBSTETRICS AND GYNECOLOGY | Facility: CLINIC | Age: 40
End: 2025-04-28
Payer: MEDICAID

## 2025-04-28 DIAGNOSIS — R39.9 UTI SYMPTOMS: Primary | ICD-10-CM

## 2025-04-28 NOTE — TELEPHONE ENCOUNTER
Returned patient's call she will go to [facility] for urine culture and will be scheduled for an appointment tomorrow.         Dave

## 2025-04-29 ENCOUNTER — OFFICE VISIT (OUTPATIENT)
Dept: OBSTETRICS AND GYNECOLOGY | Facility: CLINIC | Age: 40
End: 2025-04-29
Payer: MEDICAID

## 2025-04-29 VITALS
HEART RATE: 71 BPM | DIASTOLIC BLOOD PRESSURE: 89 MMHG | SYSTOLIC BLOOD PRESSURE: 147 MMHG | WEIGHT: 131 LBS | BODY MASS INDEX: 21.05 KG/M2 | HEIGHT: 66 IN

## 2025-04-29 DIAGNOSIS — R39.15 URGENCY OF MICTURITION: ICD-10-CM

## 2025-04-29 DIAGNOSIS — R35.0 FREQUENCY OF MICTURITION: Primary | ICD-10-CM

## 2025-04-29 PROCEDURE — 1159F MED LIST DOCD IN RCRD: CPT | Mod: CPTII,,, | Performed by: OBSTETRICS & GYNECOLOGY

## 2025-04-29 PROCEDURE — 99213 OFFICE O/P EST LOW 20 MIN: CPT | Mod: S$PBB,,, | Performed by: OBSTETRICS & GYNECOLOGY

## 2025-04-29 PROCEDURE — 3079F DIAST BP 80-89 MM HG: CPT | Mod: CPTII,,, | Performed by: OBSTETRICS & GYNECOLOGY

## 2025-04-29 PROCEDURE — 3008F BODY MASS INDEX DOCD: CPT | Mod: CPTII,,, | Performed by: OBSTETRICS & GYNECOLOGY

## 2025-04-29 PROCEDURE — 3077F SYST BP >= 140 MM HG: CPT | Mod: CPTII,,, | Performed by: OBSTETRICS & GYNECOLOGY

## 2025-04-29 RX ORDER — OXYBUTYNIN CHLORIDE 10 MG/1
10 TABLET, EXTENDED RELEASE ORAL DAILY
Qty: 30 TABLET | Refills: 2 | Status: SHIPPED | OUTPATIENT
Start: 2025-04-29 | End: 2025-04-29

## 2025-04-29 NOTE — PROGRESS NOTES
39-year-old female complains of dysuria said she has had multiple UTIs always feels like she has burning when she urinates she also wants STD testing urinalysis today is completely negative pelvic exam is normal GC chlamydia Trichomonas was tested bimanual is normal told her I and will put her on Myrbetriq generic which is the oxybutynin for 2 months and will see how she does she does not do any better will send her to Urology what she has been referred to before.  Patient has had a laparoscopic hysterectomy a salpingo-oophorectomy patient has had a questionable history of interstitial cystitis.  She has had a history of ulcers and irritable bowel syndrome